# Patient Record
Sex: FEMALE | Race: WHITE
[De-identification: names, ages, dates, MRNs, and addresses within clinical notes are randomized per-mention and may not be internally consistent; named-entity substitution may affect disease eponyms.]

---

## 2018-01-03 ENCOUNTER — HOSPITAL ENCOUNTER (EMERGENCY)
Dept: HOSPITAL 62 - ER | Age: 58
Discharge: HOME | End: 2018-01-03
Payer: COMMERCIAL

## 2018-01-03 VITALS — DIASTOLIC BLOOD PRESSURE: 74 MMHG | SYSTOLIC BLOOD PRESSURE: 116 MMHG

## 2018-01-03 DIAGNOSIS — S92.355G: Primary | ICD-10-CM

## 2018-01-03 DIAGNOSIS — F17.210: ICD-10-CM

## 2018-01-03 DIAGNOSIS — I10: ICD-10-CM
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
## 2018-01-03 DIAGNOSIS — M79.672: ICD-10-CM

## 2018-01-03 DIAGNOSIS — M79.89: ICD-10-CM

## 2018-01-03 PROCEDURE — 99283 EMERGENCY DEPT VISIT LOW MDM: CPT

## 2018-01-03 PROCEDURE — 2W3RX1Z IMMOBILIZATION OF LEFT LOWER LEG USING SPLINT: ICD-10-PCS | Performed by: EMERGENCY MEDICINE

## 2018-01-03 NOTE — ER DOCUMENT REPORT
ED Extremity Problem, Lower





- General


Chief Complaint: Foot Injury


Stated Complaint: LEFT FOOT INJURY


Time Seen by Provider: 01/03/18 10:09


Mode of Arrival: Ambulatory


Information source: Patient


Notes: 





57-year-old female presents to ED for complaint of left foot for about 3-4 

weeks.  She states is not getting any better.  She states she had accidentally 

put her weight on the foot a couple days ago when she was going to fall and the 

pain was excruciating.  The foot is black and blue is slightly swollen


TRAVEL OUTSIDE OF THE [place] IN LAST 30 DAYS: No





- HPI


Patient complains to provider of: Injury, Pain, Swelling


Location: Foot


Occurred: Other - 3 weeks3 weeks


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


Onset/Duration: Persistent


Quality of pain: Throbbing


Severity: Moderate


Pain Level: 4


Context: Fell, Twisted


Recent injury: Yes


Associated symptoms: Painful ambulation


Exacerbated by: Hanging down, Movement, Walking


Relieved by: Elevation, Ice, Rest





- Related Data


Allergies/Adverse Reactions: 


 





No Known Allergies Allergy (Verified 01/03/18 09:58)


 








Home Medications: 


 Current Home Medications





No Home Medications  01/03/18 [History]











Past Medical History





- General


Information source: Patient





- Social History


Smoking Status: Current Every Day Smoker


Cigarette use (# per day): Yes - 15


Chew tobacco use (# tins/day): No


Smoking Education Provided: Yes - 3 min


Frequency of alcohol use: None


Drug Abuse: None


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


Lives with: Family


Family History: DM.  denies: Arthritis, CAD, COPD, CVA, Hyperlipidemia, 

Hypertension, Malignancy, Thyroid Disfunction


Patient has suicidal ideation: No


Patient has homicidal ideation: No





- Past Medical History


Cardiac Medical History: Reports: None


Pulmonary Medical History: Reports: None


EENT Medical History: Reports: None


Neurological Medical History: Reports: None


Endocrine Medical History: Reports: None


Renal/ Medical History: Reports: None


Malignancy Medical History: Reports: None


GI Medical History: Reports: None


Musculoskeltal Medical History: Reports None


Skin Medical History: Reports None


Psychiatric Medical History: Reports: None


Traumatic Medical History: Reports: None


Infectious Medical History: Reports: None


Surgical Hx: Negative


Past Surgical History: Reports: None





- Immunizations


Immunizations up to date: Yes


Hx Diphtheria, Pertussis, Tetanus Vaccination: Yes





Review of Systems





- Review of Systems


Constitutional: No symptoms reported


EENT: No symptoms reported


Cardiovascular: No symptoms reported


Respiratory: No symptoms reported


Gastrointestinal: No symptoms reported


Genitourinary: No symptoms reported


Female Genitourinary: No symptoms reported


Musculoskeletal: Other - Left foot bruising, pain fifth metatarsal


Skin: Other


Hematologic/Lymphatic: No symptoms reported


Neurological/Psychological: No symptoms reported


-: Yes All other systems reviewed and negative





Physical Exam





- Vital signs


Vitals: 


 











Temp Pulse Resp BP Pulse Ox


 


 97.7 F   91   19   133/68 H  98 


 


 01/03/18 10:03 01/03/18 10:03  01/03/18 10:03 01/03/18 10:03 01/03/18 10:03











Interpretation: Normal





- General


General appearance: Appears well, Alert





- HEENT


Head: Normocephalic, Atraumatic


Eyes: Normal


Pupils: PERRL





- Respiratory


Respiratory status: No respiratory distress


Chest status: Nontender


Breath sounds: Normal


Chest palpation: Normal





- Cardiovascular


Rhythm: Regular


Heart sounds: Normal auscultation


Murmur: No





- Abdominal


Inspection: Normal


Distension: No distension


Bowel sounds: Normal


Tenderness: Nontender


Organomegaly: No organomegaly





- Back


Back: Normal, Nontender





- Extremities


General upper extremity: Normal inspection, Nontender, Normal color, Normal ROM

, Normal temperature


General lower extremity: Normal ROM, Normal temperature.  No: Amanda's sign


Foot: Tender, Ecchymosis, Metatarsal compress. pain, No evidence of FB, Tender 

5th metatarsal, Unable to bear weight - Very painful ambulation





- Neurological


Neuro grossly intact: Yes


Cognition: Normal


Orientation: AAOx4


Teja Coma Scale Eye Opening: Spontaneous


Teja Coma Scale Verbal: Oriented


[place] Coma Scale Motor: Obeys Commands


Teja Coma Scale Total: 15


Speech: Normal


Motor strength normal: LUE, RUE, LLE, RLE


Sensory: Normal





- Psychological


Associated symptoms: Normal affect, Normal mood





- Skin


Skin Temperature: Warm


Skin Moisture: Dry


Skin Color: Normal





Course





- Re-evaluation


Re-evalutation: 





01/03/18 11:48


57-year-old female presents to ED for pain and injury to her left foot 3 weeks 

ago.  She has a spiral fracture of her fifth metatarsal.  I had consulted Dr. Brooke earlier before the reading was back on the x-ray.  He stated that was 

okay to put a posterior splint and crutches and told her not to walk until she 

followed up with orthopedics.  Patient is states she is not able to follow-up 

with orthopedics as she does not have the money to go to orthopedics right now 

and I am trying to get arrangement so that she can follow-up with orthopedics 

but it might be a little bit.  I consulted Dr. Reynoso the orthopedic doctor and 

he stated if I took the posterior ankle off a Ace wrap and a postop shoe she 

would be able to walk on it but it would hurt she would need to slowly increase 

the amount of weight she puts on and to use crutches.  So the posterior ankle 

was removed Ace wrap and posterior splint was applied.  Came in the left knee 

the patient representative is trying to get her financial assistance to be able 

to go to orthopedics and get an appointment with orthopedics for her.  Patient 

was given a Norco dispense pack for pain.





- Vital Signs


Vital signs: 


 











Temp Pulse Resp BP Pulse Ox


 


 97.9 F   75   16   116/74   98 


 


 01/03/18 12:17  01/03/18 12:17  01/03/18 12:17  01/03/18 12:17  01/03/18 12:17














- Diagnostic Test


Radiology reviewed: Image reviewed, Reports reviewed





Discharge





- Discharge


Clinical Impression: 


Nondisp fracture of fifth left metatarsal bone with delayed healing


Qualifiers:


 Fracture type: closed Qualified Code(s): S92.355G - Nondisplaced fracture of 

fifth metatarsal bone, left foot, subsequent encounter for fracture with 

delayed healing





HTN (hypertension)


Qualifiers:


 Hypertension type: unspecified Qualified Code(s): I10 - Essential (primary) 

hypertension





Condition: Stable


Disposition: HOME, SELF-CARE


Additional Instructions: 


Foot Fracture





     You have a fracture in one of the small bones of the foot.  Some foot 

fractures are very serious, while others are no more serious than a sprain.  

This fracture should heal well, but requires protection for proper healing.


     Initially, you should elevate and ice pack the foot, and bear no weight on 

it.  Usually, a cast or a walking boot will be required. Some milder foot 

fractures can be managed with temporary rest, then a firm shoe.


     Your physician has determined the seriousness of your foot fracture and 

has outlined the treatment plan for you.  You should follow up as instructed to 

insure that the fracture heals without complications.


     Call the doctor or return at once if pain or swelling becomes severe, if a 

re-injury occurs, or if any part of the foot becomes numb.


Ace Wrap





     A compression dressing (ace wrap) has been placed.  This helps hold the 

area still. It limits swelling and internal bleeding.


     The wrap should be comfortably snug -- not tight.  You should feel a sense 

of pressure, but not severe pain under the wrap.  Unless the physician tells 

you otherwise, you can adjust the wrap for comfort.


     If the wrap causes symptoms suggesting it's too tight -- uncomfortable 

pressure, swelling or discoloration beyond the wrap, numbness, or severe pain -

- you must loosen the wrap.  If these symptoms don't resolve promptly, return 

for re-evaluation.





Post-Op Shoe





     You are to use a "post-op shoe," sometimes also called a "bunnion shoe."  

This shoe helps protect minor fractures, sprains, and other injuries of the 

toes or foot.


     You may remove the shoe for bathing.  Walk carefully.  If you're feeling 

pain, put less weight on the foot, take smaller steps, or use a cane.  If you 

have a new injury, you may need to use crutches for the first couple of days.  

If pain still prevents walking after a few days, contact the doctor.


     If there's unexpected pain in your foot, if blisters or sore spots develop

, or if the shoe is physically coming apart, return at once. Remember that you'

re welcome to come in at any time to have the fit of the shoe checked and 

adjusted.





USE OF CRUTCHES:


     The doctor has recommended that you not bear weight at this time. You will 

need to use crutches.  Adjust the crutches so the tops come to about two inches 

under the armpit while you are standing upright.  Use your hands -- not your 

armpits -- to support your weight.


     To get into a chair, support yourself with one crutch on the injured side.

  Hold the chair with the other hand, then lower yourself while putting all 

your weight on the good leg.


     Going up stairs is `good leg up, step up, then bring up crutches and bad 

leg.'  Down stairs is `bad leg and crutches down, then bring good leg down.'


     If you develop numbness or swelling in an arm or hand, you are using the 

crutches incorrectly.  Return if you are having any problems with the crutches.





ICE & ELEVATION:


     Apply ice packs frequently against the painful area.  Many different 

schedules are recommended, such as "20 minutes on, 20 minutes off" or "one hour 

ice, two hours rest."  If you need to work, you may need to go longer between 

ice treatments.  You should plan to have the area ice packed AT LEAST one-

fourth of the time.


     The ice should be applied over the wrap, tape, or splint, or over a layer 

of cloth -- not directly against the skin.  Some ice bags have a built-in cloth 

and can be put directly on the skin.


     Your injured part should be elevated as much as possible over the next 48 

hours.  Try to keep the injury above the level of the heart. Avoid use of the 

injured area.  Elevation and rest will decrease the swelling.








USE OF OVER-THE-COUNTER IBUPROFEN:


     Ibuprofen (Advil, Nuprin, Medipren, Motrin IB) is a medication for fever 

and pain control.  In addition, it has anti- inflammatory effects which may be 

beneficial, especially in the treatment of injuries.


     It's best to take ibuprofen with food.  Persons with ulcer disease or 

allergy to aspirin should notify their physician of this before taking 

ibuprofen.


     Ibuprofen can be given every four to six hours, for a total of four doses 

daily.


     Age              Pain or fever dose          Antiinflammatory dose


     6-8 yr              200 mg (1 tab)                200 mg (1 tab)


     9-11 yr             200 mg (1 tab)                200-400 mg (1-2 tab)


     11-14 yr            200-400 mg (1-2 tab)         400 mg (2 tab)


     15-adult            400 mg (2 tab)                600 mg (3 tab)








ORAL NARCOTIC MEDICATION:


     You have been given a Norco disp pack for pain control.  This medication 

is a narcotic.  It's best taken with food, as nausea can result if taken on an 

empty stomach.


     Don't operate machinery or drive within six hours of taking this 

medication.  Do not combine this medicine with alcohol, or with any medication 

which can cause sedation (such as cold tablets or sleeping pills) unless you 

get permission from the physician.


     Narcotics tend to cause constipation.  If possible, drink plenty of fluids 

and eat a diet high in fiber and fruits.





     Please be aware that prescription narcotics also have the potential for 

abuse.  People become addicted to these medications because of the general 

sense of wellbeing that they induce.  This feeling along with a significant 

reduction in tension, anxiety, and aggression provides a stimulating seductive 

quality to these drugs.  Once your pain is under control, we encourage you to 

discard your unused narcotics.








FOLLOW-UP CARE:


If you have been referred to a physician for follow-up care, call the physician

s office for an appointment as you were instructed or within the next two days.

  If you experience worsening or a significant change in your symptoms, notify 

the physician immediately or return to the Emergency Department at any time for 

re-evaluation.


Forms:  Elevated Blood Pressure, Smoking Cessation Education, Return to Work


Referrals: 


ARNOLDO DUNN MD [ACTIVE STAFF] - Follow up as needed

## 2018-01-03 NOTE — RADIOLOGY REPORT (SQ)
EXAM DESCRIPTION:  FOOT LEFT COMPLETE



COMPLETED DATE/TIME:  1/3/2018 10:37 am



REASON FOR STUDY:  pain and injury



COMPARISON:  None.



NUMBER OF VIEWS:  Three views.



TECHNIQUE:  AP, lateral and oblique  radiographic images acquired of the left foot.



LIMITATIONS:  None.



FINDINGS:  MINERALIZATION: Normal.

BONES: Acute spiral fracture right 5th metatarsal mid and distal diaphysis.  No significant malalignm
ent or angulation.  No intra-articular extension.

JOINTS: No effusions.

SOFT TISSUES: Left lateral foot soft tissue swelling.  No radiopaque foreign body.

OTHER: No other significant finding.



IMPRESSION:  Acute nondisplaced extra-articular spiral fracture right 5th metatarsal mid and distal d
iaphysis



TECHNICAL DOCUMENTATION:  JOB ID:  [id number]

 2011 [employer]- All Rights Reserved

## 2018-08-29 ENCOUNTER — HOSPITAL ENCOUNTER (OUTPATIENT)
Dept: HOSPITAL 62 - RAD | Age: 58
End: 2018-08-29
Payer: COMMERCIAL

## 2018-08-29 DIAGNOSIS — R19.00: Primary | ICD-10-CM

## 2018-08-29 PROCEDURE — 82565 ASSAY OF CREATININE: CPT

## 2018-08-29 PROCEDURE — 74177 CT ABD & PELVIS W/CONTRAST: CPT

## 2018-08-29 NOTE — RADIOLOGY REPORT (SQ)
EXAM DESCRIPTION:  CT ABD/PELVIS WITH IV   ORAL



COMPLETED DATE/TIME:  8/29/2018 6:09 pm



REASON FOR STUDY:  R19.00 INTRA-ABD AND PELVIC SWELLING, MASS AND LUMP, UNSP SITE R19.00  INTRA-ABD A
ND PELVIC SWELLING, MASS AND LUMP, UNSP SI



COMPARISON:  None.



TECHNIQUE:  CT scan of the abdomen and pelvis performed using helical scanning technique with dynamic
 intravenous contrast injection.  Oral contrast. Images reviewed with lung, soft tissue, and bone win
dows. Reconstructed coronal and sagittal MPR images reviewed. Delayed images for evaluation of the ur
inary system also acquired. All images stored on PACS.

All CT scanners at this facility use dose modulation, iterative reconstruction, and/or weight based d
osing when appropriate to reduce radiation dose to as low as reasonably achievable (ALARA).

[facility]: Dose Right  [facility]: CareDose    [facility]: Dose Right    [facility]: Teradose 4D    [facility]: Smart Technologies



CONTRAST TYPE AND DOSE:  contrast/concentration: Isovue 350.00 mg/ml; Total Contrast Delivered: 66.0 
ml; Total Saline Delivered: 45.0 ml



RENAL FUNCTION:  Creatinine 1



RADIATION DOSE:  CT Rad equipment meets quality standard of care and radiation dose reduction techniq
ues were employed. CTDIvol: 4.9 - 5.9 mGy. DLP: 552 mGy-cm..



LIMITATIONS:  None.



FINDINGS:  LOWER CHEST: No significant findings. No nodules or infiltrates.

LIVER: Normal size. No masses.  No dilated ducts.

SPLEEN: Normal size. No focal lesions.

PANCREAS: No masses. No significant calcifications. No adjacent inflammation or peripancreatic fluid 
collections. Pancreatic duct not dilated.

GALLBLADDER: Surgically absent.

ADRENAL GLANDS: No significant masses or asymmetry.

RIGHT KIDNEY AND URETER: No solid masses.   No significant calcifications.   No hydronephrosis or hyd
roureter.

LEFT KIDNEY AND URETER: No solid masses.   No significant calcifications.   No hydronephrosis or hydr
oureter.

AORTA AND VESSELS: No aneurysm. No dissection. Renal arteries, SMA, celiac without stenosis.

RETROPERITONEUM: No retroperitoneal adenopathy, hemorrhage or masses.

BOWEL AND PERITONEAL CAVITY: No masses or inflammatory changes. No free fluid or peritoneal masses.

APPENDIX: Not identified.

PELVIS: No mass.  No free fluid. Normal bladder.

ABDOMINAL WALL: No masses. No hernias.

BONES: No significant or acute findings.

OTHER: No other significant finding.



IMPRESSION:  NO SIGNIFICANT OR ACUTE FINDING IN THE ABDOMEN OR PELVIS ON CT SCAN WITH IV CONTRAST.



TECHNICAL DOCUMENTATION:  JOB ID:  [id number]

Quality ID # [id number]: Final reports with documentation of one or more dose reduction techniques (e.g., Au
tomated exposure control, adjustment of the mA and/or kV according to patient size, use of iterative 
reconstruction technique)

 2011 [employer]- All Rights Reserved



Reading location - IP/workstation name: [email or web address]